# Patient Record
Sex: FEMALE | Race: WHITE | NOT HISPANIC OR LATINO | URBAN - NONMETROPOLITAN AREA
[De-identification: names, ages, dates, MRNs, and addresses within clinical notes are randomized per-mention and may not be internally consistent; named-entity substitution may affect disease eponyms.]

---

## 2019-08-29 ENCOUNTER — HOSPITAL ENCOUNTER (OUTPATIENT)
Dept: GENERAL RADIOLOGY | Facility: HOSPITAL | Age: 64
Discharge: HOME | End: 2019-08-29
Admitting: ORTHOPAEDIC SURGERY

## 2019-08-29 ENCOUNTER — OFFICE VISIT (OUTPATIENT)
Dept: ORTHOPEDIC SURGERY | Facility: CLINIC | Age: 64
End: 2019-08-29

## 2019-08-29 VITALS
HEIGHT: 67 IN | SYSTOLIC BLOOD PRESSURE: 140 MMHG | HEART RATE: 63 BPM | WEIGHT: 155 LBS | BODY MASS INDEX: 24.33 KG/M2 | DIASTOLIC BLOOD PRESSURE: 75 MMHG

## 2019-08-29 DIAGNOSIS — G56.03 BILATERAL CARPAL TUNNEL SYNDROME: ICD-10-CM

## 2019-08-29 DIAGNOSIS — M79.645 BILATERAL THUMB PAIN: Primary | ICD-10-CM

## 2019-08-29 DIAGNOSIS — M18.11 ARTHRITIS OF CARPOMETACARPAL (CMC) JOINT OF RIGHT THUMB: ICD-10-CM

## 2019-08-29 DIAGNOSIS — M79.644 BILATERAL THUMB PAIN: Primary | ICD-10-CM

## 2019-08-29 PROCEDURE — 20600 DRAIN/INJ JOINT/BURSA W/O US: CPT | Performed by: ORTHOPAEDIC SURGERY

## 2019-08-29 PROCEDURE — 99203 OFFICE O/P NEW LOW 30 MIN: CPT | Performed by: ORTHOPAEDIC SURGERY

## 2019-08-29 PROCEDURE — 73130 X-RAY EXAM OF HAND: CPT

## 2019-08-29 PROCEDURE — 73130 X-RAY EXAM OF HAND: CPT | Performed by: RADIOLOGY

## 2019-08-29 RX ORDER — LEVOTHYROXINE SODIUM 0.1 MG/1
TABLET ORAL
COMMUNITY
Start: 2019-08-09

## 2019-08-29 RX ORDER — ATORVASTATIN CALCIUM 10 MG/1
TABLET, FILM COATED ORAL
COMMUNITY
Start: 2019-08-13

## 2019-08-29 RX ORDER — MELOXICAM 15 MG/1
TABLET ORAL
COMMUNITY
Start: 2019-08-14

## 2019-08-29 RX ORDER — CETIRIZINE HYDROCHLORIDE 10 MG/1
10 TABLET ORAL DAILY
COMMUNITY

## 2019-08-29 RX ADMIN — BUPIVACAINE HYDROCHLORIDE 1 ML: 5 INJECTION, SOLUTION EPIDURAL; INTRACAUDAL at 16:32

## 2019-08-29 RX ADMIN — BETAMETHASONE SODIUM PHOSPHATE AND BETAMETHASONE ACETATE 6 MG: 3; 3 INJECTION, SUSPENSION INTRA-ARTICULAR; INTRALESIONAL; INTRAMUSCULAR; SOFT TISSUE at 16:32

## 2019-08-29 NOTE — PROGRESS NOTES
"  Patient: Ele Briseno    YOB: 1955      Chief Complaint   Patient presents with   • Left Wrist - Initial Evaluation, Pain   • Right Wrist - Initial Evaluation, Pain         History of Present Illness: Presents for evaluation of her hands bilaterally.  For quite some time she said pain at the base of her thumbs and also some numbness and paresthesias of her hand.  Sometimes the pain extends up her forearm.  Does take meloxicam.  No specific injury or trauma.  Nondiabetic.  Quarter pack a day smoker.  Does work on a computer.    Past Medical History:   Diagnosis Date   • High cholesterol    • Hypothyroidism         Social History     Socioeconomic History   • Marital status: Unknown     Spouse name: Not on file   • Number of children: Not on file   • Years of education: Not on file   • Highest education level: Not on file   Tobacco Use   • Smoking status: Current Every Day Smoker     Packs/day: 0.25   • Smokeless tobacco: Never Used   • Tobacco comment: Occasional smoking   Substance and Sexual Activity   • Alcohol use: No     Frequency: Never   • Drug use: No        Review of Systems:    Pertinent positives evaluated and listed in HPI, others systems completed by patient and reviewed today.         Physical Exam: 64 y.o. female  General Appearance:    Alert and oriented x 3, cooperative, in no acute distress                   Vitals:    08/29/19 1451   BP: 140/75   Pulse: 63   Weight: 70.3 kg (155 lb)   Height: 170.2 cm (67\")          Overweight white female no obvious acute distress.  She has good range of motion of her wrist full flexion extension of her fingers without swelling she has mildly positive Finkelstein's test right greater than left she has definite positive grind test right greater than left.  Sensation is little bit decreased in the median nerves and distribution she has a mild Tinel sign at the wrist itself.   strength about 20 kg bilaterally      Radiology:       X-rays were " done of her hands today and reviewed by myself show that she has significant basal joint osteoarthritis actually greater in the left than the right.  Otherwise there is no traumatic findings    Small Joint Arthrocentesis: R thumb CMC  Consent given by: patient  Supporting Documentation  Indications: pain and diagnostic evaluation   Procedure Details  Location: thumb - R thumb CMC  Medications administered: 6 mg betamethasone acetate-betamethasone sodium phosphate 6 (3-3) MG/ML; 1 mL bupivacaine (PF) 0.5 %  Patient tolerance: patient tolerated the procedure well with no immediate complications                Assessment/Plan: Patient presents for evaluation of her hands.  Her biggest complaint seems to be more consistent with a carpal metacarpal arthritis.  She does have a little bit underlying carpal tunnel bilaterally also.  I am going to try to inject her thumb today we put her in a cock-up wrist splint we will see her back in 4 weeks to see if she has any response to this.  Ice and relative rest for the next 24 to 48 hours                Patient's Body mass index is 24.28 kg/m². BMI is within normal parameters. No follow-up required..        Discussion/Summary:    This chart was completed utilizing the dragon speech recognition software.  Grammatical errors, random word insertions, pronoun errors, and incomplete sentences or occasional consequences of the system due to software limitations, ambient noise, and hardware issues.  Any questions or concerns about the content, text, or information contained within the body of this dictation should be directly addressed to the physician for clarification          This document was signed by Pranav Zapata M.D. August 29, 2019 3:58 PM

## 2019-08-30 RX ORDER — BUPIVACAINE HYDROCHLORIDE 5 MG/ML
1 INJECTION, SOLUTION EPIDURAL; INTRACAUDAL
Status: COMPLETED | OUTPATIENT
Start: 2019-08-29 | End: 2019-08-29

## 2019-08-30 RX ORDER — BETAMETHASONE SODIUM PHOSPHATE AND BETAMETHASONE ACETATE 3; 3 MG/ML; MG/ML
6 INJECTION, SUSPENSION INTRA-ARTICULAR; INTRALESIONAL; INTRAMUSCULAR; SOFT TISSUE
Status: COMPLETED | OUTPATIENT
Start: 2019-08-29 | End: 2019-08-29

## 2019-10-01 ENCOUNTER — OFFICE VISIT (OUTPATIENT)
Dept: ORTHOPEDIC SURGERY | Facility: CLINIC | Age: 64
End: 2019-10-01

## 2019-10-01 VITALS — HEIGHT: 67 IN | BODY MASS INDEX: 24.33 KG/M2 | WEIGHT: 155 LBS

## 2019-10-01 DIAGNOSIS — M18.11 ARTHRITIS OF CARPOMETACARPAL (CMC) JOINT OF RIGHT THUMB: Primary | ICD-10-CM

## 2019-10-01 PROCEDURE — 99212 OFFICE O/P EST SF 10 MIN: CPT | Performed by: ORTHOPAEDIC SURGERY

## 2019-10-01 NOTE — PROGRESS NOTES
"Patient: Ele Briseno    YOB: 1955    Chief Complaint   Patient presents with   • Right Wrist - Follow-up, Pain   • Left Wrist - Follow-up, Pain         History of Present Illness: Patient presents for evaluation of her right hand.  She is status post injection of the basal joint of her thumb.  States while not perfect its much better than it was before.  No major coming he complains of paresthesias.  She still gets some wrist pain.    Past Medical History:   Diagnosis Date   • High cholesterol    • Hypothyroidism         Social History     Socioeconomic History   • Marital status: Unknown     Spouse name: Not on file   • Number of children: Not on file   • Years of education: Not on file   • Highest education level: Not on file   Tobacco Use   • Smoking status: Current Every Day Smoker     Packs/day: 0.25   • Smokeless tobacco: Never Used   • Tobacco comment: Occasional smoking   Substance and Sexual Activity   • Alcohol use: No     Frequency: Never   • Drug use: No   • Sexual activity: Defer           Physical Exam: 64 y.o. female  General Appearance:    Alert and oriented x 3, cooperative, in no acute distress                   Vitals:    10/01/19 0759   Weight: 70.3 kg (155 lb)   Height: 170.2 cm (67\")          Skin is intact good range of motion of her wrist and fingers.  Negative Tinel sign.  Mildly positive grind test    Radiology:             Assessment/Plan: Right thumb basal joint osteoarthritis.  Has responded pretty well to the injection.  We talked about occasional use of anti-inflammatories or any over-the-counter analgesic cream.  Ice or heat as needed.  She feels she is doing okay today.  I have discharge her from the office have not scheduled her come back.  If she feels she wants to try to another injection in the next 2 months before I leave the area she can give us a call.  If the carpal tunnel is getting worse she should most likely see Dr. Cosme talk about possible surgical " intervention          Discussion/Summary:                This chart was completed utilizing the dragon speech recognition software.  Grammatical errors, random word insertions, pronoun errors, and incomplete sentences or occasional consequences of the system due to software limitations, ambient noise, and hardware issues.  Any questions or concerns about the content, text, or information contained within the body of this dictation should be directly addressed to the physician for clarification        This document was signed by Prnaav Zapata M.D. October 1, 2019 8:15 AM

## 2023-07-27 DIAGNOSIS — M25.561 RIGHT KNEE PAIN, UNSPECIFIED CHRONICITY: Primary | ICD-10-CM

## 2023-08-07 ENCOUNTER — OFFICE VISIT (OUTPATIENT)
Dept: ORTHOPEDIC SURGERY | Facility: CLINIC | Age: 68
End: 2023-08-07
Payer: MEDICARE

## 2023-08-07 ENCOUNTER — HOSPITAL ENCOUNTER (OUTPATIENT)
Dept: GENERAL RADIOLOGY | Facility: HOSPITAL | Age: 68
Discharge: HOME | End: 2023-08-07
Admitting: ORTHOPAEDIC SURGERY
Payer: MEDICARE

## 2023-08-07 VITALS
HEIGHT: 67 IN | DIASTOLIC BLOOD PRESSURE: 86 MMHG | WEIGHT: 154.98 LBS | HEART RATE: 71 BPM | BODY MASS INDEX: 24.33 KG/M2 | SYSTOLIC BLOOD PRESSURE: 151 MMHG

## 2023-08-07 DIAGNOSIS — M25.561 RIGHT KNEE PAIN, UNSPECIFIED CHRONICITY: Primary | ICD-10-CM

## 2023-08-07 DIAGNOSIS — M25.561 RIGHT KNEE PAIN, UNSPECIFIED CHRONICITY: ICD-10-CM

## 2023-08-07 PROCEDURE — 73560 X-RAY EXAM OF KNEE 1 OR 2: CPT

## 2023-08-07 PROCEDURE — 73560 X-RAY EXAM OF KNEE 1 OR 2: CPT | Performed by: RADIOLOGY

## 2023-08-07 RX ORDER — MELATONIN
1000 DAILY
COMMUNITY

## 2023-08-07 RX ADMIN — LIDOCAINE HYDROCHLORIDE 5 ML: 10 INJECTION, SOLUTION EPIDURAL; INFILTRATION; INTRACAUDAL; PERINEURAL at 16:43

## 2023-08-07 RX ADMIN — METHYLPREDNISOLONE ACETATE 80 MG: 80 INJECTION, SUSPENSION INTRA-ARTICULAR; INTRALESIONAL; INTRAMUSCULAR; SOFT TISSUE at 16:43

## 2023-08-07 NOTE — PROGRESS NOTES
New Patient Visit      Patient: Ele Briseno  YOB: 1955  Date of Encounter: 08/07/2023        Chief Complaint:   Chief Complaint   Patient presents with    Right Knee - Pain, Edema, Initial Evaluation           HPI:   Ele Briseno, 68 y.o. female, referred by Renato Katz APRN presents for evaluation of right knee pain no known injury with onset of symptoms about 2 months ago she now experiences painful popping in her knee with giving way and has difficulty going up and down steps she has noted swelling of her right knee.  She presents having MRI completed.  She had minimal symptoms to her right knee prior to onset of the above symptoms.  She denies weakness or numbness to her right leg.  Her past medical history markable for hypothyroidism and she has undergone back surgery in the past family history is positive for diabetes and heart disease.        Active Problem List:  Patient Active Problem List   Diagnosis    Arthritis of carpometacarpal (CMC) joint of right thumb    Bilateral carpal tunnel syndrome           Past Medical History:  Past Medical History:   Diagnosis Date    High cholesterol     Hypertension     Hypothyroidism            Past Surgical History:  Past Surgical History:   Procedure Laterality Date    BACK SURGERY             Family History:  Family History   Problem Relation Age of Onset    Heart disease Mother     Heart disease Father     Diabetes Brother     Hypertension Brother          Social History:  Social History     Socioeconomic History    Marital status: Unknown   Tobacco Use    Smoking status: Every Day     Packs/day: 0.25     Types: Cigarettes    Smokeless tobacco: Never    Tobacco comments:     Occasional smoking   Vaping Use    Vaping Use: Never used   Substance and Sexual Activity    Alcohol use: No    Drug use: No    Sexual activity: Defer     Body mass index is 24.27 kg/mý.      Medications:  Current Outpatient Medications   Medication Sig Dispense Refill     atorvastatin (LIPITOR) 10 MG tablet       cetirizine (zyrTEC) 10 MG tablet Take 1 tablet by mouth Daily.      Cholecalciferol 25 MCG (1000 UT) tablet Take 1 tablet by mouth Daily.      levothyroxine (SYNTHROID, LEVOTHROID) 100 MCG tablet       LOSARTAN POTASSIUM PO       meloxicam (MOBIC) 15 MG tablet        No current facility-administered medications for this visit.         Allergies:  Allergies   Allergen Reactions    Sulfa Antibiotics Swelling    Adhesive Tape Other (See Comments)     Turns skin red    Latex Rash    Penicillins Rash         Review of Systems:   Review of Systems   Constitutional: Negative.  Negative for chills, fatigue and fever.   HENT: Negative.  Negative for congestion, ear pain, facial swelling, sore throat, trouble swallowing and voice change.    Eyes:  Positive for pain. Negative for discharge, redness and visual disturbance.   Respiratory: Negative.  Negative for apnea, cough, choking, chest tightness, shortness of breath, wheezing and stridor.    Cardiovascular: Negative.  Negative for chest pain, palpitations and leg swelling.   Gastrointestinal: Negative.  Negative for abdominal distention, abdominal pain, blood in stool, nausea and vomiting.   Endocrine: Negative.  Negative for cold intolerance, heat intolerance, polydipsia and polyphagia.   Genitourinary: Negative.  Negative for difficulty urinating, dysuria, flank pain, frequency and hematuria.   Musculoskeletal:  Positive for arthralgias, back pain, joint swelling and neck pain.   Skin: Negative.  Negative for color change, pallor, rash and wound.   Allergic/Immunologic: Negative.  Negative for environmental allergies, food allergies and immunocompromised state.   Neurological: Negative.  Negative for dizziness, tremors, seizures, syncope, speech difficulty, weakness, light-headedness, numbness and headaches.   Hematological: Negative.  Negative for adenopathy. Does not bruise/bleed easily.   Psychiatric/Behavioral: Negative.   "Negative for behavioral problems, confusion, dysphoric mood, self-injury, sleep disturbance and suicidal ideas. The patient is not nervous/anxious.        Physical Exam:   Physical Exam  GENERAL: 68 y.o. female, alert and oriented X 3 in no acute distress.   Visit Vitals  /86   Pulse 71   Ht 170.2 cm (67.01\")   Wt 70.3 kg (154 lb 15.7 oz)   BMI 24.27 kg/mý       GENERAL APPEARANCE: Awake, alert & oriented, in no acute distress and well developed, well nourished.   PSYCH: Normal mood and affect  LUNGS: Breathing nonlabored, no wheezing  EYES: Sclera anicteric, pupils equal  CARDIOVASCULAR: Palpable pulses. Capillary refill less than 2 seconds  INTEGUMENTARY: Skin intact, co clubbing, cyanosis  NEUROLOGIC: Normal Sensation  MUSCULOSKELETAL:  Orthopedic Examination: Right knee demonstrates mild effusion moderate medial joint line tenderness strongly positive Don full flexion extension without instability normal neurovascular status.          Radiology/Labs:     XR Knee Standing Right    Result Date: 8/7/2023    No acute findings in the right knee.  This report was finalized on 8/7/2023 2:50 PM by Dr. Jourdan Chew MD.       MRI Right Knee:      Radiographs right knee by my review mild squaring of the femoral condyles otherwise unremarkable.    MRI by report and by my review show tear involving the anterior horn lateral meniscus medial meniscus is preserved.      Assessment & Plan:   68 y.o. female presents with fairly acute onset right knee pain several months ago with MRI identifying neural meniscus tear.  As her symptoms are primarily lateral and she demonstrates positive Don I suspect the lateral meniscus tear is reproducing her symptoms.  Before considering surgery today we agreed to treat with intra-articular steroid injection and she is given Depo-Medrol 80 mg intra-articular with lidocaine block.  We will see her back in 2 weeks to assess her response and possibly plan surgery.        ICD-10-CM " ICD-9-CM   1. Right knee pain, unspecified chronicity  M25.561 719.46         Large Joint Arthrocentesis: R knee  Date/Time: 8/7/2023 4:43 PM  Consent given by: patient  Site marked: site marked  Timeout: Immediately prior to procedure a time out was called to verify the correct patient, procedure, equipment, support staff and site/side marked as required   Supporting Documentation  Indications: pain   Procedure Details  Location: knee - R knee  Preparation: Patient was prepped and draped in the usual sterile fashion  Needle size: 25 G  Approach: anterolateral  Medications administered: 5 mL lidocaine PF 1% 1 %; 80 mg methylPREDNISolone acetate 80 MG/ML  Patient tolerance: patient tolerated the procedure well with no immediate complications          Cc:   Renato Katz APRN                This document has been electronically signed by Dhruv Ridley MD   August 8, 2023 16:42 EDT

## 2023-08-09 RX ORDER — METHYLPREDNISOLONE ACETATE 80 MG/ML
80 INJECTION, SUSPENSION INTRA-ARTICULAR; INTRALESIONAL; INTRAMUSCULAR; SOFT TISSUE
Status: COMPLETED | OUTPATIENT
Start: 2023-08-07 | End: 2023-08-07

## 2023-08-09 RX ORDER — LIDOCAINE HYDROCHLORIDE 10 MG/ML
5 INJECTION, SOLUTION EPIDURAL; INFILTRATION; INTRACAUDAL; PERINEURAL
Status: COMPLETED | OUTPATIENT
Start: 2023-08-07 | End: 2023-08-07

## 2023-08-23 ENCOUNTER — OFFICE VISIT (OUTPATIENT)
Dept: ORTHOPEDIC SURGERY | Facility: CLINIC | Age: 68
End: 2023-08-23
Payer: MEDICARE

## 2023-08-23 VITALS — WEIGHT: 154.98 LBS | BODY MASS INDEX: 24.33 KG/M2 | HEIGHT: 67 IN

## 2023-08-23 DIAGNOSIS — S83.281D ACUTE LATERAL MENISCUS TEAR OF RIGHT KNEE, SUBSEQUENT ENCOUNTER: ICD-10-CM

## 2023-08-23 DIAGNOSIS — M25.561 RIGHT KNEE PAIN, UNSPECIFIED CHRONICITY: Primary | ICD-10-CM

## 2023-08-23 RX ORDER — METHYLPREDNISOLONE ACETATE 40 MG/ML
80 INJECTION, SUSPENSION INTRA-ARTICULAR; INTRALESIONAL; INTRAMUSCULAR; SOFT TISSUE
Status: COMPLETED | OUTPATIENT
Start: 2023-08-23 | End: 2023-08-23

## 2023-08-23 RX ORDER — LIDOCAINE HYDROCHLORIDE 10 MG/ML
5 INJECTION, SOLUTION EPIDURAL; INFILTRATION; INTRACAUDAL; PERINEURAL
Status: COMPLETED | OUTPATIENT
Start: 2023-08-23 | End: 2023-08-23

## 2023-08-23 RX ADMIN — LIDOCAINE HYDROCHLORIDE 5 ML: 10 INJECTION, SOLUTION EPIDURAL; INFILTRATION; INTRACAUDAL; PERINEURAL at 17:27

## 2023-08-23 RX ADMIN — METHYLPREDNISOLONE ACETATE 80 MG: 40 INJECTION, SUSPENSION INTRA-ARTICULAR; INTRALESIONAL; INTRAMUSCULAR; SOFT TISSUE at 17:27

## 2023-08-23 NOTE — PROGRESS NOTES
Follow-up Visit         Patient: Ele Briseno  YOB: 1955  Date of Encounter: 08/23/2023      Chief  Complaint:   Chief Complaint   Patient presents with    Right Knee - Pain, Follow-up         HPI:  Ele Briseno, 68 y.o. female presents in follow-up right knee pain which began approximately 2-1/2 months ago she had no trauma she now experiences popping in her knee with pain giving way sensation and now pain which she has difficulty tolerating.  When last seen she is provided intra-articular steroid injection Depo-Medrol 40 mg she experienced a day or 2 of relief.  She has had MRI completed previously which was reviewed last visit and it did demonstrate complex tear involving the lateral meniscus.  She presents with primary complaint of lateral knee pain.  Her past medical history includes diabetes and heart disease.        Medical History:  Patient Active Problem List   Diagnosis    Arthritis of carpometacarpal (CMC) joint of right thumb    Bilateral carpal tunnel syndrome     Past Medical History:   Diagnosis Date    High cholesterol     Hypertension     Hypothyroidism            Social History:  Social History     Socioeconomic History    Marital status:    Tobacco Use    Smoking status: Every Day     Packs/day: 0.25     Types: Cigarettes    Smokeless tobacco: Never    Tobacco comments:     Occasional smoking   Vaping Use    Vaping Use: Never used   Substance and Sexual Activity    Alcohol use: No    Drug use: No    Sexual activity: Defer           Current Medications:    Current Outpatient Medications:     atorvastatin (LIPITOR) 10 MG tablet, , Disp: , Rfl:     cetirizine (zyrTEC) 10 MG tablet, Take 1 tablet by mouth Daily., Disp: , Rfl:     cholecalciferol (VITAMIN D3) 25 MCG (1000 UT) tablet, Take 1 tablet by mouth Daily., Disp: , Rfl:     levothyroxine (SYNTHROID, LEVOTHROID) 100 MCG tablet, , Disp: , Rfl:     LOSARTAN POTASSIUM PO, , Disp: , Rfl:      meloxicam (MOBIC) 15 MG tablet, , Disp: , Rfl:         Allergies:  Allergies   Allergen Reactions    Sulfa Antibiotics Swelling    Adhesive Tape Other (See Comments)     Turns skin red    Latex Rash    Penicillins Rash           Family History:  Family History   Problem Relation Age of Onset    Heart disease Mother     Heart disease Father     Diabetes Brother     Hypertension Brother            Surgical History:  Past Surgical History:   Procedure Laterality Date    BACK SURGERY             Radiology:   XR Knee Standing Right    Result Date: 8/7/2023    No acute findings in the right knee.  This report was finalized on 8/7/2023 2:50 PM by Dr. Jourdan Chew MD.           Radiographs reviewed of right knee show very mild findings of osteoarthritis.    MRI of right knee again reviewed today demonstrates complex tear involving the lateral meniscus body and anterior horn.  Mild findings of osteoarthritis otherwise negative.      Orthopedic Examination: Right knee demonstrates minimal effusion she has marked lateral joint line tenderness especially anteriorly demonstrates no medial joint line tenderness has no gross instability demonstrates full motion with moderately positive Don.          Assessment & Plan:   68 y.o. female presents with right knee pain of 2-1/2 months duration with minimal response to intra-articular steroid injection with previous MRI demonstrating lateral meniscus tear.  We again discussed her options today she is provided intra-articular steroid injection Depo-Medrol 80 mg with lidocaine block she is scheduled back for follow-up in 2 weeks if she does not receive significant relief we have agreed that her scopic partial lateral meniscectomy is her best option.           Diagnosis Plan   1. Right knee pain, unspecified chronicity        2. Acute lateral meniscus tear of right knee, subsequent encounter              Large Joint Arthrocentesis: R knee  Date/Time: 8/23/2023 5:27  PM  Consent given by: patient  Site marked: site marked  Timeout: Immediately prior to procedure a time out was called to verify the correct patient, procedure, equipment, support staff and site/side marked as required   Supporting Documentation  Indications: pain   Procedure Details  Location: knee - R knee  Preparation: Patient was prepped and draped in the usual sterile fashion  Needle size: 25 G  Approach: anterolateral  Medications administered: 80 mg methylPREDNISolone acetate 40 MG/ML; 5 mL lidocaine PF 1% 1 %  Patient tolerance: patient tolerated the procedure well with no immediate complications          Cc:  Renato Katz APRN              This document has been electronically signed by Dhruv Ridley MD   August 23, 2023 17:27 EDT

## 2023-09-11 ENCOUNTER — OFFICE VISIT (OUTPATIENT)
Dept: ORTHOPEDIC SURGERY | Facility: CLINIC | Age: 68
End: 2023-09-11
Payer: MEDICARE

## 2023-09-11 VITALS
DIASTOLIC BLOOD PRESSURE: 98 MMHG | WEIGHT: 154.76 LBS | HEART RATE: 77 BPM | BODY MASS INDEX: 24.29 KG/M2 | SYSTOLIC BLOOD PRESSURE: 170 MMHG | HEIGHT: 67 IN

## 2023-09-11 DIAGNOSIS — S83.281D ACUTE LATERAL MENISCUS TEAR OF RIGHT KNEE, SUBSEQUENT ENCOUNTER: Primary | ICD-10-CM

## 2023-09-11 RX ORDER — OLMESARTAN MEDOXOMIL 20 MG/1
20 TABLET ORAL 2 TIMES DAILY
COMMUNITY

## 2023-09-11 NOTE — PROGRESS NOTES
History and Physical      Patient: Ele Briseno  YOB: 1955  Date of Encounter: 09/11/2023      Chief Complaint:   Chief Complaint   Patient presents with    Right Knee - Pain, Follow-up           HPI:   Ele Briseno, 68 y.o. female, presents for evaluation of continued right knee pain.  She has been symptomatic slightly over 3 months but has had no trauma.  She describes popping sensation as well as giving way sensation and localizes pain to the lateral aspect of her right knee anteriorly.  She has had several intra-articular steroid injections with only 2 days of relief most recently was provided intra-articular steroid injection and again experienced 2 days of improvement.  MRI previously obtained demonstrates complex tear involving the anterior horn lateral meniscus.  Her past medical history is remarkable for hypothyroidism        Active Problem List:  Patient Active Problem List   Diagnosis    Arthritis of carpometacarpal (CMC) joint of right thumb    Bilateral carpal tunnel syndrome    Acute lateral meniscus tear of right knee           Past Medical History:  Past Medical History:   Diagnosis Date    High cholesterol     Hypertension     Hypothyroidism            Past Surgical History:  Past Surgical History:   Procedure Laterality Date    BACK SURGERY             Family History:  Family History   Problem Relation Age of Onset    Heart disease Mother     Heart disease Father     Diabetes Brother     Hypertension Brother            Social History:  Social History     Socioeconomic History    Marital status:    Tobacco Use    Smoking status: Every Day     Packs/day: 0.25     Types: Cigarettes    Smokeless tobacco: Never    Tobacco comments:     Occasional smoking   Vaping Use    Vaping Use: Never used   Substance and Sexual Activity    Alcohol use: No    Drug use: No    Sexual activity: Defer     Body mass index is 24.23 kg/m².        Medications:  Current Outpatient Medications    Medication Sig Dispense Refill    atorvastatin (LIPITOR) 10 MG tablet       cetirizine (zyrTEC) 10 MG tablet Take 1 tablet by mouth Daily.      cholecalciferol (VITAMIN D3) 25 MCG (1000 UT) tablet Take 1 tablet by mouth Daily.      levothyroxine (SYNTHROID, LEVOTHROID) 100 MCG tablet       meloxicam (MOBIC) 15 MG tablet       olmesartan (BENICAR) 20 MG tablet Take 1 tablet by mouth 2 (Two) Times a Day.      LOSARTAN POTASSIUM PO        No current facility-administered medications for this visit.           Allergies:  Allergies   Allergen Reactions    Sulfa Antibiotics Swelling    Adhesive Tape Other (See Comments)     Turns skin red    Latex Rash    Penicillins Rash           Review of Systems:   Review of Systems   Constitutional: Negative.  Negative for chills, fatigue and fever.   HENT: Negative.  Negative for congestion, ear pain, facial swelling, sore throat, trouble swallowing and voice change.    Eyes:  Negative for pain, discharge, redness and visual disturbance.   Respiratory: Negative.  Negative for apnea, cough, choking, chest tightness, shortness of breath, wheezing and stridor.    Cardiovascular: Negative.  Negative for chest pain, palpitations and leg swelling.   Gastrointestinal: Negative.  Negative for abdominal distention, abdominal pain, blood in stool, nausea and vomiting.   Endocrine: Negative.  Negative for cold intolerance, heat intolerance, polydipsia and polyphagia.   Genitourinary: Negative.  Negative for difficulty urinating, dysuria, flank pain, frequency and hematuria.   Musculoskeletal:  Positive for arthralgias.   Skin: Negative.  Negative for color change, pallor, rash and wound.   Allergic/Immunologic: Negative.  Negative for environmental allergies, food allergies and immunocompromised state.   Neurological: Negative.  Negative for dizziness, tremors, seizures, syncope, speech difficulty, weakness, light-headedness, numbness and headaches.   Hematological: Negative.  Negative for  "adenopathy. Does not bruise/bleed easily.   Psychiatric/Behavioral: Negative.  Negative for behavioral problems, confusion, dysphoric mood, self-injury, sleep disturbance and suicidal ideas. The patient is not nervous/anxious.          Physical Exam:   Physical Exam  Vitals and nursing note reviewed.   Constitutional:       General: She is not in acute distress.     Appearance: She is not diaphoretic.   HENT:      Head: Normocephalic and atraumatic.      Right Ear: External ear normal.      Left Ear: External ear normal.   Eyes:      General:         Right eye: No discharge.         Left eye: No discharge.      Conjunctiva/sclera: Conjunctivae normal.   Cardiovascular:      Rate and Rhythm: Normal rate and regular rhythm.      Heart sounds: Normal heart sounds. No murmur heard.  Pulmonary:      Effort: Pulmonary effort is normal. No respiratory distress.      Breath sounds: Normal breath sounds. No wheezing.   Abdominal:      General: There is no distension.      Palpations: Abdomen is soft.      Tenderness: There is no guarding.   Musculoskeletal:      Cervical back: Normal range of motion and neck supple.   Skin:     General: Skin is warm and dry.      Capillary Refill: Capillary refill takes less than 2 seconds.   Neurological:      Mental Status: She is alert and oriented to person, place, and time.   Psychiatric:         Behavior: Behavior normal.         Thought Content: Thought content normal.         Judgment: Judgment normal.   GENERAL: 68 y.o. female, alert and oriented X 3 in no acute distress.   Visit Vitals  /98   Pulse 77   Ht 170.2 cm (67.01\")   Wt 70.2 kg (154 lb 12.2 oz)   BMI 24.23 kg/m²       Musculoskeletal Examination: Right knee demonstrates mild effusion with marked tenderness along the anterolateral joint line.  Straits no medial joint line tenderness has negative Lachman negative drawer and no gross instability with varus valgus stressing with normal neurovascular " status.          Radiology/Labs:    Radiographs right knee previously obtained and again reviewed today are unremarkable.    MRI right knee demonstrates complex tear involving the mid body and anterior horn of lateral meniscus.        Assessment & Plan:   68 y.o. female presents with 3-month history of right knee pain with MRI demonstrating obvious complex tear involving the mid body and anterior horn lateral meniscus she has failed numerous intra-articular steroid injections.  Again reviewed her options she wishes to proceed with proposed right knee arthroscopy partial lateral meniscectomy possible chondroplasty surgery is scheduled [facility] September 14, 2023.      ICD-10-CM ICD-9-CM   1. Acute lateral meniscus tear of right knee, subsequent encounter  S83.281D V58.89     836.1           Cc:   Renato Katz APRN              This document has been electronically signed by Dhruv Ridley MD   September 12, 2023 08:29 EDT

## 2023-09-11 NOTE — H&P (VIEW-ONLY)
History and Physical      Patient: Ele Briseno  YOB: 1955  Date of Encounter: 09/11/2023      Chief Complaint:   Chief Complaint   Patient presents with    Right Knee - Pain, Follow-up           HPI:   Ele Briseno, 68 y.o. female, presents for evaluation of continued right knee pain.  She has been symptomatic slightly over 3 months but has had no trauma.  She describes popping sensation as well as giving way sensation and localizes pain to the lateral aspect of her right knee anteriorly.  She has had several intra-articular steroid injections with only 2 days of relief most recently was provided intra-articular steroid injection and again experienced 2 days of improvement.  MRI previously obtained demonstrates complex tear involving the anterior horn lateral meniscus.  Her past medical history is remarkable for hypothyroidism        Active Problem List:  Patient Active Problem List   Diagnosis    Arthritis of carpometacarpal (CMC) joint of right thumb    Bilateral carpal tunnel syndrome    Acute lateral meniscus tear of right knee           Past Medical History:  Past Medical History:   Diagnosis Date    High cholesterol     Hypertension     Hypothyroidism            Past Surgical History:  Past Surgical History:   Procedure Laterality Date    BACK SURGERY             Family History:  Family History   Problem Relation Age of Onset    Heart disease Mother     Heart disease Father     Diabetes Brother     Hypertension Brother            Social History:  Social History     Socioeconomic History    Marital status:    Tobacco Use    Smoking status: Every Day     Packs/day: 0.25     Types: Cigarettes    Smokeless tobacco: Never    Tobacco comments:     Occasional smoking   Vaping Use    Vaping Use: Never used   Substance and Sexual Activity    Alcohol use: No    Drug use: No    Sexual activity: Defer     Body mass index is 24.23 kg/m².        Medications:  Current Outpatient Medications    Medication Sig Dispense Refill    atorvastatin (LIPITOR) 10 MG tablet       cetirizine (zyrTEC) 10 MG tablet Take 1 tablet by mouth Daily.      cholecalciferol (VITAMIN D3) 25 MCG (1000 UT) tablet Take 1 tablet by mouth Daily.      levothyroxine (SYNTHROID, LEVOTHROID) 100 MCG tablet       meloxicam (MOBIC) 15 MG tablet       olmesartan (BENICAR) 20 MG tablet Take 1 tablet by mouth 2 (Two) Times a Day.      LOSARTAN POTASSIUM PO        No current facility-administered medications for this visit.           Allergies:  Allergies   Allergen Reactions    Sulfa Antibiotics Swelling    Adhesive Tape Other (See Comments)     Turns skin red    Latex Rash    Penicillins Rash           Review of Systems:   Review of Systems   Constitutional: Negative.  Negative for chills, fatigue and fever.   HENT: Negative.  Negative for congestion, ear pain, facial swelling, sore throat, trouble swallowing and voice change.    Eyes:  Negative for pain, discharge, redness and visual disturbance.   Respiratory: Negative.  Negative for apnea, cough, choking, chest tightness, shortness of breath, wheezing and stridor.    Cardiovascular: Negative.  Negative for chest pain, palpitations and leg swelling.   Gastrointestinal: Negative.  Negative for abdominal distention, abdominal pain, blood in stool, nausea and vomiting.   Endocrine: Negative.  Negative for cold intolerance, heat intolerance, polydipsia and polyphagia.   Genitourinary: Negative.  Negative for difficulty urinating, dysuria, flank pain, frequency and hematuria.   Musculoskeletal:  Positive for arthralgias.   Skin: Negative.  Negative for color change, pallor, rash and wound.   Allergic/Immunologic: Negative.  Negative for environmental allergies, food allergies and immunocompromised state.   Neurological: Negative.  Negative for dizziness, tremors, seizures, syncope, speech difficulty, weakness, light-headedness, numbness and headaches.   Hematological: Negative.  Negative for  "adenopathy. Does not bruise/bleed easily.   Psychiatric/Behavioral: Negative.  Negative for behavioral problems, confusion, dysphoric mood, self-injury, sleep disturbance and suicidal ideas. The patient is not nervous/anxious.          Physical Exam:   Physical Exam  Vitals and nursing note reviewed.   Constitutional:       General: She is not in acute distress.     Appearance: She is not diaphoretic.   HENT:      Head: Normocephalic and atraumatic.      Right Ear: External ear normal.      Left Ear: External ear normal.   Eyes:      General:         Right eye: No discharge.         Left eye: No discharge.      Conjunctiva/sclera: Conjunctivae normal.   Cardiovascular:      Rate and Rhythm: Normal rate and regular rhythm.      Heart sounds: Normal heart sounds. No murmur heard.  Pulmonary:      Effort: Pulmonary effort is normal. No respiratory distress.      Breath sounds: Normal breath sounds. No wheezing.   Abdominal:      General: There is no distension.      Palpations: Abdomen is soft.      Tenderness: There is no guarding.   Musculoskeletal:      Cervical back: Normal range of motion and neck supple.   Skin:     General: Skin is warm and dry.      Capillary Refill: Capillary refill takes less than 2 seconds.   Neurological:      Mental Status: She is alert and oriented to person, place, and time.   Psychiatric:         Behavior: Behavior normal.         Thought Content: Thought content normal.         Judgment: Judgment normal.   GENERAL: 68 y.o. female, alert and oriented X 3 in no acute distress.   Visit Vitals  /98   Pulse 77   Ht 170.2 cm (67.01\")   Wt 70.2 kg (154 lb 12.2 oz)   BMI 24.23 kg/m²       Musculoskeletal Examination: Right knee demonstrates mild effusion with marked tenderness along the anterolateral joint line.  [place]raits no medial joint line tenderness has negative Lachman negative drawer and no gross instability with varus valgus stressing with normal neurovascular " status.          Radiology/Labs:    Radiographs right knee previously obtained and again reviewed today are unremarkable.    MRI right knee demonstrates complex tear involving the mid body and anterior horn of lateral meniscus.        Assessment & Plan:   68 y.o. female presents with 3-month history of right knee pain with MRI demonstrating obvious complex tear involving the mid body and anterior horn lateral meniscus she has failed numerous intra-articular steroid injections.  Again reviewed her options she wishes to proceed with proposed right knee arthroscopy partial lateral meniscectomy possible chondroplasty surgery is scheduled [facility] September 14, 2023.      ICD-10-CM ICD-9-CM   1. Acute lateral meniscus tear of right knee, subsequent encounter  S83.281D V58.89     836.1           Cc:   Renato Katz APRN              This document has been electronically signed by Dhruv Ridley MD   September 12, 2023 08:29 EDT

## 2023-09-12 PROBLEM — S83.281A ACUTE LATERAL MENISCUS TEAR OF RIGHT KNEE: Status: ACTIVE | Noted: 2023-09-12

## 2023-09-15 ENCOUNTER — PRE-ADMISSION TESTING (OUTPATIENT)
Dept: PREADMISSION TESTING | Facility: HOSPITAL | Age: 68
End: 2023-09-15
Payer: MEDICARE

## 2023-09-15 DIAGNOSIS — S83.281D ACUTE LATERAL MENISCUS TEAR OF RIGHT KNEE, SUBSEQUENT ENCOUNTER: ICD-10-CM

## 2023-09-15 LAB
ANION GAP SERPL CALCULATED.3IONS-SCNC: 9.1 MMOL/L (ref 5–15)
BUN SERPL-MCNC: 13 MG/DL (ref 8–23)
BUN/CREAT SERPL: 12 (ref 7–25)
CALCIUM SPEC-SCNC: 9.8 MG/DL (ref 8.6–10.5)
CHLORIDE SERPL-SCNC: 101 MMOL/L (ref 98–107)
CO2 SERPL-SCNC: 28.9 MMOL/L (ref 22–29)
CREAT SERPL-MCNC: 1.08 MG/DL (ref 0.57–1)
DEPRECATED RDW RBC AUTO: 48.9 FL (ref 37–54)
EGFRCR SERPLBLD CKD-EPI 2021: 56.1 ML/MIN/1.73
ERYTHROCYTE [DISTWIDTH] IN BLOOD BY AUTOMATED COUNT: 13.8 % (ref 12.3–15.4)
GLUCOSE SERPL-MCNC: 92 MG/DL (ref 65–99)
HCT VFR BLD AUTO: 41.8 % (ref 34–46.6)
HGB BLD-MCNC: 13.4 G/DL (ref 12–15.9)
MCH RBC QN AUTO: 30.5 PG (ref 26.6–33)
MCHC RBC AUTO-ENTMCNC: 32.1 G/DL (ref 31.5–35.7)
MCV RBC AUTO: 95.2 FL (ref 79–97)
PLATELET # BLD AUTO: 219 10*3/MM3 (ref 140–450)
PMV BLD AUTO: 10.4 FL (ref 6–12)
POTASSIUM SERPL-SCNC: 3.7 MMOL/L (ref 3.5–5.2)
QT INTERVAL: 388 MS
QTC INTERVAL: 427 MS
RBC # BLD AUTO: 4.39 10*6/MM3 (ref 3.77–5.28)
SODIUM SERPL-SCNC: 139 MMOL/L (ref 136–145)
WBC NRBC COR # BLD: 5.34 10*3/MM3 (ref 3.4–10.8)

## 2023-09-15 PROCEDURE — 85027 COMPLETE CBC AUTOMATED: CPT

## 2023-09-15 PROCEDURE — 93005 ELECTROCARDIOGRAM TRACING: CPT

## 2023-09-15 PROCEDURE — 93010 ELECTROCARDIOGRAM REPORT: CPT | Performed by: SPECIALIST

## 2023-09-15 PROCEDURE — 36415 COLL VENOUS BLD VENIPUNCTURE: CPT

## 2023-09-15 PROCEDURE — 80048 BASIC METABOLIC PNL TOTAL CA: CPT

## 2023-09-15 RX ORDER — AMLODIPINE BESYLATE 5 MG/1
5 TABLET ORAL DAILY
COMMUNITY

## 2023-09-15 NOTE — DISCHARGE INSTRUCTIONS

## 2023-09-19 ENCOUNTER — HOSPITAL ENCOUNTER (OUTPATIENT)
Facility: HOSPITAL | Age: 68
Setting detail: HOSPITAL OUTPATIENT SURGERY
Discharge: HOME | End: 2023-09-19
Attending: ORTHOPAEDIC SURGERY | Admitting: ORTHOPAEDIC SURGERY

## 2023-09-19 ENCOUNTER — APPOINTMENT (OUTPATIENT)
Dept: GENERAL RADIOLOGY | Facility: HOSPITAL | Age: 68
End: 2023-09-19

## 2023-09-19 ENCOUNTER — ANESTHESIA (OUTPATIENT)
Dept: PERIOP | Facility: HOSPITAL | Age: 68
End: 2023-09-19
Payer: MEDICARE

## 2023-09-19 ENCOUNTER — ANESTHESIA EVENT (OUTPATIENT)
Dept: PERIOP | Facility: HOSPITAL | Age: 68
End: 2023-09-19
Payer: MEDICARE

## 2023-09-19 VITALS
HEIGHT: 67 IN | WEIGHT: 148 LBS | BODY MASS INDEX: 23.23 KG/M2 | OXYGEN SATURATION: 96 % | RESPIRATION RATE: 18 BRPM | TEMPERATURE: 97.6 F | SYSTOLIC BLOOD PRESSURE: 116 MMHG | HEART RATE: 78 BPM | DIASTOLIC BLOOD PRESSURE: 65 MMHG

## 2023-09-19 DIAGNOSIS — S83.281D ACUTE LATERAL MENISCUS TEAR OF RIGHT KNEE, SUBSEQUENT ENCOUNTER: ICD-10-CM

## 2023-09-19 PROBLEM — S83.281A ACUTE LATERAL MENISCUS TEAR OF RIGHT KNEE: Status: RESOLVED | Noted: 2023-09-12 | Resolved: 2023-09-19

## 2023-09-19 PROCEDURE — 25010000002 PROPOFOL 200 MG/20ML EMULSION: Performed by: NURSE ANESTHETIST, CERTIFIED REGISTERED

## 2023-09-19 PROCEDURE — 25010000002 FENTANYL CITRATE (PF) 50 MCG/ML SOLUTION: Performed by: NURSE ANESTHETIST, CERTIFIED REGISTERED

## 2023-09-19 PROCEDURE — 29881 ARTHRS KNE SRG MNISECTMY M/L: CPT | Performed by: ORTHOPAEDIC SURGERY

## 2023-09-19 PROCEDURE — 25010000002 ONDANSETRON PER 1 MG: Performed by: NURSE ANESTHETIST, CERTIFIED REGISTERED

## 2023-09-19 PROCEDURE — 25010000002 MIDAZOLAM PER 1 MG: Performed by: NURSE ANESTHETIST, CERTIFIED REGISTERED

## 2023-09-19 PROCEDURE — 25010000002 BUPIVACAINE 0.5 % SOLUTION: Performed by: ORTHOPAEDIC SURGERY

## 2023-09-19 RX ORDER — FENTANYL CITRATE 50 UG/ML
INJECTION, SOLUTION INTRAMUSCULAR; INTRAVENOUS AS NEEDED
Status: DISCONTINUED | OUTPATIENT
Start: 2023-09-19 | End: 2023-09-19 | Stop reason: SURG

## 2023-09-19 RX ORDER — SODIUM CHLORIDE 0.9 % (FLUSH) 0.9 %
10 SYRINGE (ML) INJECTION AS NEEDED
Status: DISCONTINUED | OUTPATIENT
Start: 2023-09-19 | End: 2023-09-19 | Stop reason: HOSPADM

## 2023-09-19 RX ORDER — MAGNESIUM HYDROXIDE 1200 MG/15ML
LIQUID ORAL AS NEEDED
Status: DISCONTINUED | OUTPATIENT
Start: 2023-09-19 | End: 2023-09-19 | Stop reason: HOSPADM

## 2023-09-19 RX ORDER — SODIUM CHLORIDE 9 MG/ML
40 INJECTION, SOLUTION INTRAVENOUS AS NEEDED
Status: DISCONTINUED | OUTPATIENT
Start: 2023-09-19 | End: 2023-09-19 | Stop reason: HOSPADM

## 2023-09-19 RX ORDER — MIDAZOLAM HYDROCHLORIDE 1 MG/ML
0.5 INJECTION INTRAMUSCULAR; INTRAVENOUS
Status: DISCONTINUED | OUTPATIENT
Start: 2023-09-19 | End: 2023-09-19 | Stop reason: HOSPADM

## 2023-09-19 RX ORDER — SODIUM CHLORIDE, SODIUM LACTATE, POTASSIUM CHLORIDE, CALCIUM CHLORIDE 600; 310; 30; 20 MG/100ML; MG/100ML; MG/100ML; MG/100ML
125 INJECTION, SOLUTION INTRAVENOUS ONCE
Status: COMPLETED | OUTPATIENT
Start: 2023-09-19 | End: 2023-09-19

## 2023-09-19 RX ORDER — FENTANYL CITRATE 50 UG/ML
50 INJECTION, SOLUTION INTRAMUSCULAR; INTRAVENOUS
Status: DISCONTINUED | OUTPATIENT
Start: 2023-09-19 | End: 2023-09-19 | Stop reason: HOSPADM

## 2023-09-19 RX ORDER — IPRATROPIUM BROMIDE AND ALBUTEROL SULFATE 2.5; .5 MG/3ML; MG/3ML
3 SOLUTION RESPIRATORY (INHALATION) ONCE AS NEEDED
Status: DISCONTINUED | OUTPATIENT
Start: 2023-09-19 | End: 2023-09-19 | Stop reason: HOSPADM

## 2023-09-19 RX ORDER — HYDROCODONE BITARTRATE AND ACETAMINOPHEN 5; 325 MG/1; MG/1
1 TABLET ORAL EVERY 4 HOURS PRN
Qty: 8 TABLET | Refills: 0 | Status: SHIPPED | OUTPATIENT
Start: 2023-09-19

## 2023-09-19 RX ORDER — SODIUM CHLORIDE 0.9 % (FLUSH) 0.9 %
10 SYRINGE (ML) INJECTION EVERY 12 HOURS SCHEDULED
Status: DISCONTINUED | OUTPATIENT
Start: 2023-09-19 | End: 2023-09-19 | Stop reason: HOSPADM

## 2023-09-19 RX ORDER — SODIUM CHLORIDE, SODIUM LACTATE, POTASSIUM CHLORIDE, CALCIUM CHLORIDE 600; 310; 30; 20 MG/100ML; MG/100ML; MG/100ML; MG/100ML
100 INJECTION, SOLUTION INTRAVENOUS ONCE AS NEEDED
Status: DISCONTINUED | OUTPATIENT
Start: 2023-09-19 | End: 2023-09-19 | Stop reason: HOSPADM

## 2023-09-19 RX ORDER — MIDAZOLAM HYDROCHLORIDE 1 MG/ML
INJECTION INTRAMUSCULAR; INTRAVENOUS AS NEEDED
Status: DISCONTINUED | OUTPATIENT
Start: 2023-09-19 | End: 2023-09-19 | Stop reason: SURG

## 2023-09-19 RX ORDER — FAMOTIDINE 10 MG/ML
INJECTION, SOLUTION INTRAVENOUS AS NEEDED
Status: DISCONTINUED | OUTPATIENT
Start: 2023-09-19 | End: 2023-09-19 | Stop reason: SURG

## 2023-09-19 RX ORDER — ONDANSETRON 2 MG/ML
INJECTION INTRAMUSCULAR; INTRAVENOUS AS NEEDED
Status: DISCONTINUED | OUTPATIENT
Start: 2023-09-19 | End: 2023-09-19 | Stop reason: SURG

## 2023-09-19 RX ORDER — OXYCODONE HYDROCHLORIDE AND ACETAMINOPHEN 5; 325 MG/1; MG/1
1 TABLET ORAL ONCE AS NEEDED
Status: DISCONTINUED | OUTPATIENT
Start: 2023-09-19 | End: 2023-09-19 | Stop reason: HOSPADM

## 2023-09-19 RX ORDER — ONDANSETRON 2 MG/ML
4 INJECTION INTRAMUSCULAR; INTRAVENOUS AS NEEDED
Status: DISCONTINUED | OUTPATIENT
Start: 2023-09-19 | End: 2023-09-19 | Stop reason: HOSPADM

## 2023-09-19 RX ORDER — LIDOCAINE HYDROCHLORIDE 20 MG/ML
INJECTION, SOLUTION EPIDURAL; INFILTRATION; INTRACAUDAL; PERINEURAL AS NEEDED
Status: DISCONTINUED | OUTPATIENT
Start: 2023-09-19 | End: 2023-09-19 | Stop reason: SURG

## 2023-09-19 RX ORDER — BUPIVACAINE HYDROCHLORIDE 5 MG/ML
INJECTION, SOLUTION PERINEURAL AS NEEDED
Status: DISCONTINUED | OUTPATIENT
Start: 2023-09-19 | End: 2023-09-19 | Stop reason: HOSPADM

## 2023-09-19 RX ORDER — PROPOFOL 10 MG/ML
INJECTION, EMULSION INTRAVENOUS AS NEEDED
Status: DISCONTINUED | OUTPATIENT
Start: 2023-09-19 | End: 2023-09-19 | Stop reason: SURG

## 2023-09-19 RX ADMIN — FAMOTIDINE 20 MG: 10 INJECTION, SOLUTION INTRAVENOUS at 10:01

## 2023-09-19 RX ADMIN — LIDOCAINE HYDROCHLORIDE 60 MG: 20 INJECTION, SOLUTION EPIDURAL; INFILTRATION; INTRACAUDAL; PERINEURAL at 10:04

## 2023-09-19 RX ADMIN — FENTANYL CITRATE 50 MCG: 50 INJECTION INTRAMUSCULAR; INTRAVENOUS at 10:04

## 2023-09-19 RX ADMIN — SODIUM CHLORIDE, POTASSIUM CHLORIDE, SODIUM LACTATE AND CALCIUM CHLORIDE 125 ML/HR: 600; 310; 30; 20 INJECTION, SOLUTION INTRAVENOUS at 08:19

## 2023-09-19 RX ADMIN — SODIUM CHLORIDE, POTASSIUM CHLORIDE, SODIUM LACTATE AND CALCIUM CHLORIDE: 600; 310; 30; 20 INJECTION, SOLUTION INTRAVENOUS at 10:01

## 2023-09-19 RX ADMIN — FENTANYL CITRATE 50 MCG: 50 INJECTION INTRAMUSCULAR; INTRAVENOUS at 10:47

## 2023-09-19 RX ADMIN — PROPOFOL 150 MG: 10 INJECTION, EMULSION INTRAVENOUS at 10:04

## 2023-09-19 RX ADMIN — FENTANYL CITRATE 50 MCG: 50 INJECTION, SOLUTION INTRAMUSCULAR; INTRAVENOUS at 11:13

## 2023-09-19 RX ADMIN — ONDANSETRON 4 MG: 2 INJECTION INTRAMUSCULAR; INTRAVENOUS at 10:07

## 2023-09-19 RX ADMIN — FENTANYL CITRATE 50 MCG: 50 INJECTION, SOLUTION INTRAMUSCULAR; INTRAVENOUS at 11:19

## 2023-09-19 RX ADMIN — MIDAZOLAM HYDROCHLORIDE 2 MG: 1 INJECTION, SOLUTION INTRAMUSCULAR; INTRAVENOUS at 10:01

## 2023-09-19 NOTE — ANESTHESIA POSTPROCEDURE EVALUATION
Patient: Ele Briseno    Procedure Summary       Date: 09/19/23 Room / Location: [facility] OR 03 /  [facility] OR    Anesthesia Start: 1001 Anesthesia Stop: 1053    Procedure: RIGHT KNEE ARTHROSCOPY WITH PARTIAL LATERAL MENISCECTOMY (Right: Knee) Diagnosis:       Acute lateral meniscus tear of right knee, subsequent encounter      (Acute lateral meniscus tear of right knee, subsequent encounter [S83.281D])    Surgeons: Dhruv Ridley MD Provider: Manas Forbes MD    Anesthesia Type: general ASA Status: 3            Anesthesia Type: general    Vitals  Vitals Value Taken Time   /59 09/19/23 1125   Temp 97.4 °F (36.3 °C) 09/19/23 1055   Pulse 70 09/19/23 1125   Resp 16 09/19/23 1125   SpO2 95 % 09/19/23 1125           Post Anesthesia Care and Evaluation    Patient location during evaluation: bedside  Patient participation: complete - patient participated  Level of consciousness: awake and alert  Pain score: 1  Pain management: adequate    Airway patency: patent  Anesthetic complications: No anesthetic complications  PONV Status: none  Cardiovascular status: acceptable  Respiratory status: acceptable  Hydration status: acceptable

## 2023-09-19 NOTE — OP NOTE
KNEE ARTHROSCOPY WITH CHONDROPLASTY  Procedure Note    Ele Brsieno  9/19/2023    Pre-op Diagnosis:   Acute lateral meniscus tear of right knee, subsequent encounter [S83.281D]    Post-op Diagnosis:     Post-Op Diagnosis Codes:     * Acute lateral meniscus tear of right knee, subsequent encounter [S83.281D]           Procedure(s):  RIGHT KNEE ARTHROSCOPY WITH PARTIAL LATERAL MENISCECTOMY    Surgeon(s):  Dhruv Ridley MD    Anesthesia: General/local    Operative technique: With patient in the operating theatre under general anesthesia with right leg placed in leg foy and tourniquet the extremity exsanguinated and the tourniquet inflated to 250 mmHg.  Standard arthroscopy portals were created the knee flighted with sterile saline arthroscope introduced the anterolateral portal passed into the suprapatellar pouch and brought distally.  Patella with grade 2 fibrillation over the proximal aspect.  Medial compartment entered and there was generative changes within the medial femoral condyle the medial meniscus extensively probed and no significant defect identified.  Lateral compartment entered moderate osteoarthritis primarily lateral most portion of the distal femur.  The complex tear anterior horn lateral meniscus was identified and then removed piecemeal alternating with punch and patellar shaver essentially removing the anterior horn lateral meniscus debriding to stable rim within the midportion.  Hemostasis was obtained with electrocautery.  Debris was evacuated.  Water was expressed portals injected with 10 cc 0.5 Marcaine wounds closed with 3-0 nylon was sterile dressing applied he was taken to recovery room in stable condition.    Staff:   Circulator: Anna Collazo RN; Ashley Gage RN  Scrub Person: Kailee Burrows; Yanelis Bahena    Estimated Blood Loss: none    Specimens:   none               Implants/Grafts: none      Drains: None    Complications: none    Tourniquet time: 29  min                       Dhruv Ridley MD     Date: 9/19/2023  Time: 10:50 EDT    Cc: Clarisse Louis APRN

## 2023-09-19 NOTE — ANESTHESIA PROCEDURE NOTES
Airway  Urgency: elective    Date/Time: 9/19/2023 10:04 AM    General Information and Staff    Patient location during procedure: OR    Indications and Patient Condition    Preoxygenated: yes  Mask difficulty assessment: 0 - not attempted    Final Airway Details  Final airway type: supraglottic airway      Successful airway: LMA  Size 4     Number of attempts at approach: 1  Assessment: lips, teeth, and gum same as pre-op

## 2023-09-25 ENCOUNTER — OFFICE VISIT (OUTPATIENT)
Dept: ORTHOPEDIC SURGERY | Facility: CLINIC | Age: 68
End: 2023-09-25
Payer: MEDICARE

## 2023-09-25 VITALS — WEIGHT: 147.93 LBS | BODY MASS INDEX: 23.22 KG/M2 | HEIGHT: 67 IN

## 2023-09-25 DIAGNOSIS — Z98.890 S/P ARTHROSCOPIC KNEE SURGERY: Primary | ICD-10-CM

## 2023-09-25 PROCEDURE — 99024 POSTOP FOLLOW-UP VISIT: CPT | Performed by: ORTHOPAEDIC SURGERY

## 2023-09-25 NOTE — PROGRESS NOTES
Postoperative Follow-up          Patient: Ele Briseno  YOB: 1955  Date of Encounter: 09/25/2023      Chief Complaint:   Chief Complaint   Patient presents with    Right Knee - Post-op Knee     Procedure(s):09/19/2023  RIGHT KNEE ARTHROSCOPY WITH PARTIAL LATERAL MENISCECTOMY     Surgeon(s):  Dhruv Ridley MD              HPI:  Ele Briseno, 68 y.o. female returns in postoperative follow-up arthroscopic partial lateral meniscectomy right knee she is now 6 days postop doing well her pain is at least 50% improved.        Medical History:  Patient Active Problem List   Diagnosis    Arthritis of carpometacarpal (CMC) joint of right thumb    Bilateral carpal tunnel syndrome     Past Medical History:   Diagnosis Date    Arthritis     Elevated cholesterol     GERD (gastroesophageal reflux disease)     High cholesterol     History of frequent URI     Hypertension     Hypothyroidism     Knee pain            Surgical History:  Past Surgical History:   Procedure Laterality Date    BACK SURGERY      fixed disc    CATARACT EXTRACTION Bilateral     KNEE ARTHROSCOPY Right 9/19/2023    Procedure: RIGHT KNEE ARTHROSCOPY WITH PARTIAL LATERAL MENISCECTOMY;  Surgeon: Dhruv Ridley MD;  Location: [facility];  Service: Orthopedics;  Laterality: Right;    MANDIBLE SURGERY      VITRECTOMY         Examination:  Examination right knee demonstrates minimal effusion with intact arthroscopy portals and no surrounding erythema.        Assessment & Plan:  68 y.o. female presents follow-up right knee arthroscopy partial lateral meniscectomy doing well she is referred to physical therapy per protocol with scheduled follow-up in 6 weeks.       Diagnosis Plan   1. S/P arthroscopic knee surgery  Ambulatory Referral to Physical Therapy POST OP (09/19/2023 Arthroscopic partial lateral meniscectomy, quadricep weakness), Evaluate and treat; Strengthening (Quadriceps), ROM                Cc:  Clarisse Louis,  APRN              This document has been electronically signed by Dhruv Ridley MD   September 25, 2023 16:32 EDT

## 2023-11-06 ENCOUNTER — OFFICE VISIT (OUTPATIENT)
Dept: ORTHOPEDIC SURGERY | Facility: CLINIC | Age: 68
End: 2023-11-06
Payer: MEDICARE

## 2023-11-06 VITALS — WEIGHT: 147.93 LBS | BODY MASS INDEX: 23.22 KG/M2 | HEIGHT: 67 IN

## 2023-11-06 DIAGNOSIS — M17.11 PRIMARY OSTEOARTHRITIS OF RIGHT KNEE: Primary | ICD-10-CM

## 2023-11-06 DIAGNOSIS — Z98.890 S/P ARTHROSCOPIC KNEE SURGERY: ICD-10-CM

## 2023-11-06 RX ORDER — LEVOCETIRIZINE DIHYDROCHLORIDE 5 MG/1
TABLET, FILM COATED ORAL
COMMUNITY
Start: 2023-10-17

## 2023-11-06 RX ADMIN — LIDOCAINE HYDROCHLORIDE 5 ML: 10 INJECTION, SOLUTION EPIDURAL; INFILTRATION; INTRACAUDAL; PERINEURAL at 13:10

## 2023-11-06 NOTE — PROGRESS NOTES
Postoperative Follow-up          Patient: Ele Briseno  YOB: 1955  Date of Encounter: 11/06/2023      Chief Complaint:   Chief Complaint   Patient presents with    Right Knee - Follow-up, Post-op Knee     Procedure(s):09/19/2023  RIGHT KNEE ARTHROSCOPY WITH PARTIAL LATERAL MENISCECTOMY     Surgeon(s):  Dhruv Ridley MD              HPI:  Ele Briseno, 68 y.o. female returns in postoperative follow-up right knee arthroscopic partial lateral meniscectomy. She is 7 weeks postop with moderate improvement she continues to experience intermittent swelling and pain right knee but is improved from her preoperative status. She is currently attending physical therapy and making progress.        Medical History:  Patient Active Problem List   Diagnosis    Arthritis of carpometacarpal (CMC) joint of right thumb    Bilateral carpal tunnel syndrome     Past Medical History:   Diagnosis Date    Arthritis     Elevated cholesterol     GERD (gastroesophageal reflux disease)     High cholesterol     History of frequent URI     Hypertension     Hypothyroidism     Knee pain            Surgical History:  Past Surgical History:   Procedure Laterality Date    BACK SURGERY      fixed disc    CATARACT EXTRACTION Bilateral     KNEE ARTHROSCOPY Right 9/19/2023    Procedure: RIGHT KNEE ARTHROSCOPY WITH PARTIAL LATERAL MENISCECTOMY;  Surgeon: Dhruv Ridley MD;  Location: [facility];  Service: Orthopedics;  Laterality: Right;    MANDIBLE SURGERY      VITRECTOMY           Examination:  Examination knee reveals minimal effusion with moderate neural joint line tenderness no gross instability with varus valgus stressing Lachman or drawer normal neurovascular status.        Assessment & Plan:  68 y.o. female presents weeks following arthroscopic partial lateral meniscectomy with intraoperative findings of early degenerative arthritis. We discussed her options given that her effusion is under control and her  mechanical symptoms have resolved today she is provided Durolane intra-articular with lidocaine block right knee she will follow-up as needed.       Diagnosis Plan   1. Primary osteoarthritis of right knee        2. S/P arthroscopic knee surgery            Large Joint Arthrocentesis: R knee  Date/Time: 11/6/2023 1:10 PM  Consent given by: patient  Site marked: site marked  Timeout: Immediately prior to procedure a time out was called to verify the correct patient, procedure, equipment, support staff and site/side marked as required   Supporting Documentation  Indications: pain   Procedure Details  Location: knee - R knee  Preparation: Patient was prepped and draped in the usual sterile fashion  Needle size: 20 G  Approach: anterolateral  Medications administered: 5 mL lidocaine PF 1% 1 %; 60 mg Sodium Hyaluronate 60 MG/3ML  Patient tolerance: patient tolerated the procedure well with no immediate complications            Cc:  Clarisse Louis APRN              This document has been electronically signed by Dhruv Ridley MD   November 6, 2023 13:10 EST

## 2023-11-10 RX ORDER — LIDOCAINE HYDROCHLORIDE 10 MG/ML
5 INJECTION, SOLUTION EPIDURAL; INFILTRATION; INTRACAUDAL; PERINEURAL
Status: COMPLETED | OUTPATIENT
Start: 2023-11-06 | End: 2023-11-06

## 2023-11-14 ENCOUNTER — OFFICE VISIT (OUTPATIENT)
Dept: CARDIOLOGY | Facility: CLINIC | Age: 68
End: 2023-11-14
Payer: MEDICARE

## 2023-11-14 VITALS
DIASTOLIC BLOOD PRESSURE: 70 MMHG | HEIGHT: 67 IN | WEIGHT: 153.4 LBS | BODY MASS INDEX: 24.08 KG/M2 | SYSTOLIC BLOOD PRESSURE: 119 MMHG | HEART RATE: 86 BPM | OXYGEN SATURATION: 93 %

## 2023-11-14 DIAGNOSIS — I20.89 ANGINAL EQUIVALENT: ICD-10-CM

## 2023-11-14 DIAGNOSIS — R06.02 SHORTNESS OF BREATH: Primary | ICD-10-CM

## 2023-11-14 DIAGNOSIS — I10 ESSENTIAL HYPERTENSION: ICD-10-CM

## 2023-11-14 DIAGNOSIS — F17.210 CIGARETTE SMOKER: ICD-10-CM

## 2023-11-14 DIAGNOSIS — E78.5 DYSLIPIDEMIA: ICD-10-CM

## 2023-11-14 PROCEDURE — 99204 OFFICE O/P NEW MOD 45 MIN: CPT | Performed by: SPECIALIST

## 2023-11-14 PROCEDURE — 3074F SYST BP LT 130 MM HG: CPT | Performed by: SPECIALIST

## 2023-11-14 PROCEDURE — 93000 ELECTROCARDIOGRAM COMPLETE: CPT | Performed by: SPECIALIST

## 2023-11-14 PROCEDURE — 3078F DIAST BP <80 MM HG: CPT | Performed by: SPECIALIST

## 2023-11-14 NOTE — PROGRESS NOTES
Subjective   Initial consultation, shortness of breath  Ele Briseno is a 68 y.o. female who presents to day for Heart Problem (New Patient).    CHIEF COMPLIANT  Chief Complaint   Patient presents with    Heart Problem     New Patient       Active Problems:  Problem List Items Addressed This Visit          Cardiac and Vasculature    Anginal equivalent    Relevant Orders    Stress Test With Myocardial Perfusion One Day    Adult Transthoracic Echo Complete w/ Color, Spectral and Contrast if necessary per protocol    Essential hypertension    Dyslipidemia    Relevant Orders    Lipid Panel    Comprehensive Metabolic Panel       Pulmonary and Pneumonias    Shortness of breath - Primary    Relevant Orders    Stress Test With Myocardial Perfusion One Day    Adult Transthoracic Echo Complete w/ Color, Spectral and Contrast if necessary per protocol       Tobacco    Cigarette smoker       HPI  HPI  For the last few months she has been having shortness of breath so is not sure if this is allergies or not but she gets short of breath climbing flights of stairs she does not have chest pain but she is not very active because of knee problems she has rare palpitations when she feels her heart briefly runs fast no edema she has history of hypertension no history of diabetes but she has history of hyperlipidemia and she is on a statin she smoked for almost 50 years maybe half a pack per day family history wise both of her parents have some heart condition but she is not sure what the heart condition but he ended with congestive heart failure and both of them  PRIOR MEDS  Current Outpatient Medications on File Prior to Visit   Medication Sig Dispense Refill    amLODIPine (NORVASC) 5 MG tablet Take 1 tablet by mouth Daily.      atorvastatin (LIPITOR) 10 MG tablet       cholecalciferol (VITAMIN D3) 25 MCG (1000 UT) tablet Take 1 tablet by mouth Daily.      levocetirizine (XYZAL) 5 MG tablet       levothyroxine (SYNTHROID,  "LEVOTHROID) 100 MCG tablet       meloxicam (MOBIC) 15 MG tablet       olmesartan (BENICAR) 20 MG tablet Take 1 tablet by mouth 2 (Two) Times a Day.      [DISCONTINUED] cetirizine (zyrTEC) 10 MG tablet Take 1 tablet by mouth Daily.      [DISCONTINUED] HYDROcodone-acetaminophen (NORCO) 5-325 MG per tablet Take 1 tablet by mouth every 4 (four) hours as needed for pain. 8 tablet 0     No current facility-administered medications on file prior to visit.       ALLERGIES  Sulfa antibiotics, Adhesive tape, Latex, and Penicillins    HISTORY  Past Medical History:   Diagnosis Date    Arthritis     Elevated cholesterol     GERD (gastroesophageal reflux disease)     High cholesterol     History of frequent URI     Hypertension     Hypothyroidism     Knee pain        Social History     Socioeconomic History    Marital status:    Tobacco Use    Smoking status: Every Day     Packs/day: .25     Types: Cigarettes    Smokeless tobacco: Never    Tobacco comments:     Occasional smoking   Vaping Use    Vaping Use: Never used   Substance and Sexual Activity    Alcohol use: No    Drug use: No    Sexual activity: Defer       Family History   Problem Relation Age of Onset    Heart disease Mother     Heart disease Father     Diabetes Brother     Hypertension Brother        Review of Systems   Respiratory:  Positive for shortness of breath. Negative for apnea, cough, choking, chest tightness, wheezing and stridor.    Cardiovascular:  Positive for palpitations. Negative for chest pain and leg swelling.   Musculoskeletal:  Positive for back pain.   Hematological:  Bruises/bleeds easily.       Objective     VITALS: /70 (BP Location: Left arm, Patient Position: Sitting, Cuff Size: Adult)   Pulse 86   Ht 170.2 cm (67.01\")   Wt 69.6 kg (153 lb 6.4 oz)   SpO2 93%   BMI 24.02 kg/m²     LABS:   Lab Results (most recent)       None            IMAGING:   XR Knee Standing Right    Result Date: 8/7/2023    No acute findings in the " right knee.  This report was finalized on 8/7/2023 2:50 PM by Dr. Jourdan Chew MD.        EXAM:  Physical Exam  Vitals reviewed.   Constitutional:       Appearance: She is well-developed.   HENT:      Head: Normocephalic and atraumatic.   Eyes:      Pupils: Pupils are equal, round, and reactive to light.   Neck:      Thyroid: No thyromegaly.      Vascular: No JVD.   Cardiovascular:      Rate and Rhythm: Normal rate and regular rhythm.      Heart sounds: Normal heart sounds. No murmur heard.     No friction rub. No gallop.   Pulmonary:      Effort: Pulmonary effort is normal. No respiratory distress.      Breath sounds: Normal breath sounds. No stridor. No wheezing or rales.   Chest:      Chest wall: No tenderness.   Musculoskeletal:         General: No tenderness or deformity.      Cervical back: Neck supple.   Skin:     General: Skin is warm and dry.   Neurological:      Mental Status: She is alert and oriented to person, place, and time.      Cranial Nerves: No cranial nerve deficit.      Coordination: Coordination normal.         Procedure     ECG 12 Lead    Date/Time: 11/14/2023 10:45 AM  Performed by: Nayely Shoemaker MD    Authorized by: Nayely Shoemaker MD         EKG: Normal sinus rhythm otherwise within normal limits compared with EKG on 9/15/2023 no significant change  Assessment & Plan     Diagnoses and all orders for this visit:    1. Shortness of breath (Primary)  -     Stress Test With Myocardial Perfusion One Day; Future  -     Adult Transthoracic Echo Complete w/ Color, Spectral and Contrast if necessary per protocol; Future    2. Anginal equivalent  -     Stress Test With Myocardial Perfusion One Day; Future  -     Adult Transthoracic Echo Complete w/ Color, Spectral and Contrast if necessary per protocol; Future    3. Essential hypertension    4. Dyslipidemia  -     Lipid Panel; Future  -     Comprehensive Metabolic Panel; Future    5. Cigarette smoker    Other orders  -     ECG 12 Lead    1.  She is  having some relatively recent shortness of breath could be possible angina equivalent going to go ahead and do a stress test for assessment of ischemia also get an echocardiogram to assess cardiac function wall motion valve morphology  2.  Her blood pressure is well controlled we will continue current management  3.  She is taking a small dose of atorvastatin I will try to check her lipid profile since it has not been checked for a while  4.  I strongly advised her to quit smoking but she is not willing to do so    Return After stress test.      Advance Care Planning   ACP discussion was declined by the patient. Patient does not have an advance directive, declines further assistance.             MEDS ORDERED DURING VISIT:  No orders of the defined types were placed in this encounter.      As always, Clarisse Louis APRN  I appreciate very much the opportunity to participate in the cardiovascular care of your patients. Please do not hesitate to call me with any questions with regards to Ele Briseno evaluation and management.         This document has been electronically signed by Nayely Shoemaker MD  November 14, 2023 11:53 EST    This note is dictated utilizing voice recognition software.

## 2023-12-13 ENCOUNTER — HOSPITAL ENCOUNTER (OUTPATIENT)
Dept: CARDIOLOGY | Facility: HOSPITAL | Age: 68
Discharge: HOME | End: 2023-12-13
Payer: MEDICARE

## 2023-12-13 ENCOUNTER — HOSPITAL ENCOUNTER (OUTPATIENT)
Dept: NUCLEAR MEDICINE | Facility: HOSPITAL | Age: 68
Discharge: HOME | End: 2023-12-13
Payer: MEDICARE

## 2023-12-13 DIAGNOSIS — R06.02 SHORTNESS OF BREATH: ICD-10-CM

## 2023-12-13 DIAGNOSIS — I20.89 ANGINAL EQUIVALENT: ICD-10-CM

## 2023-12-13 LAB
BH CV ECHO MEAS - ACS: 2.1 CM
BH CV ECHO MEAS - AO MAX PG: 6 MMHG
BH CV ECHO MEAS - AO MEAN PG: 3 MMHG
BH CV ECHO MEAS - AO ROOT DIAM: 3 CM
BH CV ECHO MEAS - AO V2 MAX: 122 CM/SEC
BH CV ECHO MEAS - AO V2 VTI: 24.1 CM
BH CV ECHO MEAS - EDV(CUBED): 62.1 ML
BH CV ECHO MEAS - EDV(MOD-SP4): 62.3 ML
BH CV ECHO MEAS - EF(MOD-SP4): 66.6 %
BH CV ECHO MEAS - ESV(CUBED): 15.4 ML
BH CV ECHO MEAS - ESV(MOD-SP4): 20.8 ML
BH CV ECHO MEAS - FS: 37.1 %
BH CV ECHO MEAS - IVS/LVPW: 0.9 CM
BH CV ECHO MEAS - IVSD: 0.76 CM
BH CV ECHO MEAS - LA DIMENSION: 1.75 CM
BH CV ECHO MEAS - LAT PEAK E' VEL: 7.9 CM/SEC
BH CV ECHO MEAS - LV DIASTOLIC VOL/BSA (35-75): 34.5 CM2
BH CV ECHO MEAS - LV MASS(C)D: 91.5 GRAMS
BH CV ECHO MEAS - LV SYSTOLIC VOL/BSA (12-30): 11.5 CM2
BH CV ECHO MEAS - LVIDD: 4 CM
BH CV ECHO MEAS - LVIDS: 2.49 CM
BH CV ECHO MEAS - LVOT AREA: 3.1 CM2
BH CV ECHO MEAS - LVOT DIAM: 2 CM
BH CV ECHO MEAS - LVPWD: 0.84 CM
BH CV ECHO MEAS - MED PEAK E' VEL: 7.4 CM/SEC
BH CV ECHO MEAS - MV A MAX VEL: 70.3 CM/SEC
BH CV ECHO MEAS - MV E MAX VEL: 52.7 CM/SEC
BH CV ECHO MEAS - MV E/A: 0.75
BH CV ECHO MEAS - PA ACC TIME: 0.12 SEC
BH CV ECHO MEAS - RAP SYSTOLE: 10 MMHG
BH CV ECHO MEAS - RVSP: 33.4 MMHG
BH CV ECHO MEAS - SI(MOD-SP4): 23 ML/M2
BH CV ECHO MEAS - SV(MOD-SP4): 41.5 ML
BH CV ECHO MEAS - TAPSE (>1.6): 2.6 CM
BH CV ECHO MEAS - TR MAX PG: 23.4 MMHG
BH CV ECHO MEAS - TR MAX VEL: 242 CM/SEC
BH CV ECHO MEASUREMENTS AVERAGE E/E' RATIO: 6.89
BH CV NUCLEAR PRIOR STUDY: 3
BH CV REST NUCLEAR ISOTOPE DOSE: 10.1 MCI
BH CV STRESS BP STAGE 1: NORMAL
BH CV STRESS DURATION MIN STAGE 1: 2
BH CV STRESS DURATION SEC STAGE 1: 30
BH CV STRESS GRADE STAGE 1: 10
BH CV STRESS HR STAGE 1: 130
BH CV STRESS METS STAGE 1: 5
BH CV STRESS NUCLEAR ISOTOPE DOSE: 30.3 MCI
BH CV STRESS PROTOCOL 1: NORMAL
BH CV STRESS RECOVERY BP: NORMAL MMHG
BH CV STRESS RECOVERY HR: 77 BPM
BH CV STRESS SPEED STAGE 1: 1.7
BH CV STRESS STAGE 1: 1
LEFT ATRIUM VOLUME INDEX: 9.3 ML/M2
LV EF NUC BP: 62 %
MAXIMAL PREDICTED HEART RATE: 152 BPM
PERCENT MAX PREDICTED HR: 85.53 %
STRESS BASELINE BP: NORMAL MMHG
STRESS BASELINE HR: 80 BPM
STRESS PERCENT HR: 101 %
STRESS POST ESTIMATED WORKLOAD: 4.6 METS
STRESS POST EXERCISE DUR MIN: 2 MIN
STRESS POST EXERCISE DUR SEC: 30 SEC
STRESS POST PEAK BP: NORMAL MMHG
STRESS POST PEAK HR: 130 BPM
STRESS TARGET HR: 129 BPM

## 2023-12-13 PROCEDURE — 78452 HT MUSCLE IMAGE SPECT MULT: CPT

## 2023-12-13 PROCEDURE — 93306 TTE W/DOPPLER COMPLETE: CPT

## 2023-12-13 PROCEDURE — A9500 TC99M SESTAMIBI: HCPCS | Performed by: SPECIALIST

## 2023-12-13 PROCEDURE — 0 TECHNETIUM SESTAMIBI: Performed by: SPECIALIST

## 2023-12-13 PROCEDURE — 93017 CV STRESS TEST TRACING ONLY: CPT

## 2023-12-13 RX ADMIN — TECHNETIUM TC 99M SESTAMIBI 1 DOSE: 1 INJECTION INTRAVENOUS at 10:35

## 2023-12-13 RX ADMIN — TECHNETIUM TC 99M SESTAMIBI 1 DOSE: 1 INJECTION INTRAVENOUS at 11:50

## 2025-04-14 NOTE — ANESTHESIA PREPROCEDURE EVALUATION
Anesthesia Evaluation     no history of anesthetic complications:   NPO Solid Status: > 8 hours  NPO Liquid Status: > 8 hours           Airway   Mallampati: II  TM distance: >3 FB  Neck ROM: full  No difficulty expected  Dental    (+) poor dentition    Pulmonary - normal exam   (+) a smoker Current,  Cardiovascular - normal exam    (+) hypertension, hyperlipidemia      Neuro/Psych  (+) numbness  GI/Hepatic/Renal/Endo    (+) GERD, thyroid problem hypothyroidism    Musculoskeletal     Abdominal  - normal exam   Substance History      OB/GYN          Other   arthritis,                   Anesthesia Plan    ASA 3     general     intravenous induction     Anesthetic plan, risks, benefits, and alternatives have been provided, discussed and informed consent has been obtained with: patient.    CODE STATUS:         
Provider made aware
72.6

## (undated) DEVICE — DISPOSABLE TOURNIQUET CUFF SINGLE BLADDER, SINGLE PORT AND LUER LOCK CONNECTOR: Brand: COLOR CUFF

## (undated) DEVICE — NDL HYPO ECLPS SFTY 22G 1 1/2IN

## (undated) DEVICE — PATIENT RETURN ELECTRODE, SINGLE-USE, CONTACT QUALITY MONITORING, ADULT, WITH 9FT CORD, FOR PATIENTS WEIGING OVER 33LBS. (15KG): Brand: MEGADYNE

## (undated) DEVICE — TBG PUMP ARTHSCP MAIN AR6400 16FT

## (undated) DEVICE — PROB ABLAT SERFAS ENERGY 90S 3.5MM

## (undated) DEVICE — BLD CUT FORMLA AGGR PLS 4.0MM

## (undated) DEVICE — PAD WRAP/ON KN COOL/THERP W/ELAS/STRAP LF

## (undated) DEVICE — GAUZE,SPONGE,4"X4",16PLY,STRL,LF,10/TRAY: Brand: MEDLINE

## (undated) DEVICE — SUT ETHLN 3-0 FS118IN 663H

## (undated) DEVICE — SYS COLD/THRP POLAR/CARE/CUBE

## (undated) DEVICE — PENCL ES MEGADINE EZ/CLEAN BUTN W/HOLSTR 10FT

## (undated) DEVICE — BNDG ELAS ELITE V/CLOSE 6IN 5YD LF STRL

## (undated) DEVICE — DRSNG WND GZ CURAD OIL EMULSION 3X8IN LF STRL 1PK

## (undated) DEVICE — BNDG ESMARK 6INX9FT STRL

## (undated) DEVICE — GLV SURG PREMIERPRO MIC LTX PF SZ8 BRN

## (undated) DEVICE — NDL SPINE 22G 31/2IN BLK

## (undated) DEVICE — APPL CHLORAPREP HI/LITE 26ML ORNG

## (undated) DEVICE — BNDG ELAS CO-FLEX SLF ADHR 4IN5YD LF STRL

## (undated) DEVICE — PK KN ARTHSCP 70

## (undated) DEVICE — HOLDER: Brand: DEROYAL

## (undated) DEVICE — NEEDLE, QUINCKE 22GX3.5": Brand: MEDLINE INDUSTRIES, INC.

## (undated) DEVICE — NDL HYPO ECLPS SFTY 18G 1 1/2IN